# Patient Record
Sex: MALE | Race: ASIAN | NOT HISPANIC OR LATINO | Employment: FULL TIME | URBAN - METROPOLITAN AREA
[De-identification: names, ages, dates, MRNs, and addresses within clinical notes are randomized per-mention and may not be internally consistent; named-entity substitution may affect disease eponyms.]

---

## 2019-01-24 ENCOUNTER — OFFICE VISIT (OUTPATIENT)
Dept: URGENT CARE | Facility: PHYSICIAN GROUP | Age: 29
End: 2019-01-24
Payer: COMMERCIAL

## 2019-01-24 VITALS
DIASTOLIC BLOOD PRESSURE: 58 MMHG | TEMPERATURE: 103.9 F | BODY MASS INDEX: 24.85 KG/M2 | HEART RATE: 121 BPM | WEIGHT: 173.6 LBS | HEIGHT: 70 IN | OXYGEN SATURATION: 93 % | SYSTOLIC BLOOD PRESSURE: 138 MMHG

## 2019-01-24 DIAGNOSIS — J10.1 INFLUENZA A: ICD-10-CM

## 2019-01-24 PROCEDURE — 99203 OFFICE O/P NEW LOW 30 MIN: CPT | Performed by: FAMILY MEDICINE

## 2019-01-24 RX ORDER — OSELTAMIVIR PHOSPHATE 75 MG/1
75 CAPSULE ORAL 2 TIMES DAILY
Qty: 10 CAP | Refills: 0 | Status: SHIPPED | OUTPATIENT
Start: 2019-01-24

## 2019-01-24 ASSESSMENT — ENCOUNTER SYMPTOMS
MYALGIAS: 1
NAUSEA: 0
DIARRHEA: 0
FEVER: 1
DIZZINESS: 1
SORE THROAT: 1
VOMITING: 0
HEADACHES: 1
SHORTNESS OF BREATH: 0
COUGH: 1
ABDOMINAL PAIN: 0

## 2019-01-24 NOTE — LETTER
January 24, 2019    To Whom It May Concern:         This is confirmation that Jean Pierre Soto attended his scheduled appointment with Asael Hunt M.D. on 1/24/19.  Pt has influenza and is highly contagious.  He should not be around people.  Please excuse him from work tomorrow.           If you have any questions please do not hesitate to call me at the phone number listed below.    Sincerely,          Asael Hunt M.D.  [phone]

## 2019-01-25 NOTE — PROGRESS NOTES
"Subjective:     Jean Pierre Soto is a 28 y.o. male who presents for Fever (sore throat, body aches, dizziness, headache, x2 days )    HPI  Pt presents for evaluation of a new problem   Pt with cough frequently   Feeling achy all over  Myalgias are worst in the back  Myalgias are constant and ibuprofen/Tylenol do not seem to be helping  Myalgias are slowly worsening  Claims he had a fever at home up to 107  Having a headache today  Cough is dry and nonproductive  Has not taken any medications for his symptoms    Review of Systems   Constitutional: Positive for fever.   HENT: Positive for congestion and sore throat.    Respiratory: Positive for cough. Negative for shortness of breath.    Cardiovascular: Negative for chest pain.   Gastrointestinal: Negative for abdominal pain, diarrhea, nausea and vomiting.   Musculoskeletal: Positive for myalgias.   Skin: Negative for rash.   Neurological: Positive for dizziness and headaches.     PMH: No history of asthma  MEDS: No daily meds  ALLERGIES: No known allergies  SURGHX: History reviewed. No pertinent surgical history.  SOCHX:  reports that he has never smoked. He has never used smokeless tobacco. He reports that he does not drink alcohol or use drugs.  FH: Family history was reviewed, not contributing to acute illness     Objective:   /58 (BP Location: Right arm, Patient Position: Sitting, BP Cuff Size: Adult)   Pulse (!) 121   Temp (!) 39.9 °C (103.9 °F) (Temporal)   Ht 1.778 m (5' 10\")   Wt 78.7 kg (173 lb 9.6 oz)   SpO2 93%   BMI 24.91 kg/m²     Physical Exam   Constitutional:   Appears fatigued   HENT:   Head: Normocephalic and atraumatic.   Right Ear: Tympanic membrane, external ear and ear canal normal.   Left Ear: Tympanic membrane, external ear and ear canal normal.   Nose: Mucosal edema and rhinorrhea present.   Mouth/Throat: Uvula is midline, oropharynx is clear and moist and mucous membranes are normal. No oropharyngeal exudate.   Eyes: Pupils are equal, " round, and reactive to light. Conjunctivae and EOM are normal. Right eye exhibits no discharge. Left eye exhibits no discharge. No scleral icterus.   Neck: Normal range of motion. No tracheal deviation present.   Cardiovascular: Normal rate, regular rhythm and normal heart sounds.    Pulmonary/Chest: Effort normal and breath sounds normal. No respiratory distress. He has no wheezes. He has no rales.   Musculoskeletal: Normal range of motion.   Neurological: He is alert.   Skin: Skin is warm and dry. No rash noted.   Psychiatric: He has a normal mood and affect. His behavior is normal. Judgment and thought content normal.       Assessment/Plan:   Assessment    1. Influenza A  Patient is a 28-year-old male with history and exam consistent with influenza.  Point-of-care influenza swab positive for influenza A.  Will treat with Tamiflu.  Follow-up as needed.  - oseltamivir (TAMIFLU) 75 MG Cap; Take 1 Cap by mouth 2 times a day.  Dispense: 10 Cap; Refill: 0